# Patient Record
Sex: MALE | URBAN - METROPOLITAN AREA
[De-identification: names, ages, dates, MRNs, and addresses within clinical notes are randomized per-mention and may not be internally consistent; named-entity substitution may affect disease eponyms.]

---

## 2023-03-22 ENCOUNTER — OFFICE VISIT (OUTPATIENT)
Dept: URBAN - METROPOLITAN AREA CLINIC 33 | Facility: CLINIC | Age: 63
End: 2023-03-22
Payer: COMMERCIAL

## 2023-03-22 DIAGNOSIS — H47.011 ISCHEMIC OPTIC NEUROPATHY, RIGHT EYE: Primary | ICD-10-CM

## 2023-03-22 PROCEDURE — 99204 OFFICE O/P NEW MOD 45 MIN: CPT | Performed by: OPTOMETRIST

## 2023-03-22 ASSESSMENT — INTRAOCULAR PRESSURE
OD: 17
OS: 17

## 2023-03-22 NOTE — IMPRESSION/PLAN
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 Plan: Discussed condition with patient. Patient has decreased vision in the right eye since 10 days. 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 Patient denies treatment of any current medical conditions but has not been to PCP for ~5 years. Discussed condition as a result of poor blood flow to the vessels supplying the optic nerve. Patient is being referred for immediate blood work and imaging or brain and orbits. Patient does not have a car and has difficulty getting around to his appointments. Recommend patient go to ED for immediate blood work and imaging to rule out possible signs of stroke. Patient to follow-up with retina OMD in 1-2 weeks.